# Patient Record
(demographics unavailable — no encounter records)

---

## 2025-01-16 NOTE — DISCUSSION/SUMMARY
[FreeTextEntry1] : This note was written by Ryan Balderas on 01/15/2025 actively solely SIMONA Contreras M.D.     All medical record entries made by the Scribe were at my, SIMONA Contreras M.D. direction and personally dictated by me on 01/15/2025. I have personally reviewed the chart and agree that the record reflects my personal performance of the history, physical exam, assessment, and plan.

## 2025-01-16 NOTE — HISTORY OF PRESENT ILLNESS
[FreeTextEntry1] : Pt is a 60-year-old postmenopausal female who presents today for a follow-up GYN visit for an in-office Sono hysterogram. Patient was uncomfortable during procedure. Sono today shows a small polyp. In 2017, she had a biopsy due to fibroid. Denies vb. Patient prefers in office procedure due to anesthesia.   Polyp(s): 1)  0.86 x 1.2 x 0.5 cm on SPR today. Patient was very uncomfortable with infusion of saline and only injected approz 20 cc

## 2025-01-16 NOTE — PLAN
[FreeTextEntry1] : -Discussed options for treatment including in office hysteroscopy vs at the hospital. Risks/ benefits discussed with patient. -Rx for Cytotec sent to pharmacy.  -RTO on January 30th at 12pm for in office hysteroscopy. Explained to patient multiple times that this is under local anesthesia and could be just as uncomfortable or even more so than SPR but patient insistent on doing it in the office

## 2025-02-12 NOTE — DISCUSSION/SUMMARY
[Reviewed Clinical Lab Test(s)] : Results of clinical tests were reviewed. [Reviewed Radiology Report(s)] : Radiology reports were reviewed. [Discuss Alternatives/Risks/Benefits w/Patient] : All alternatives, risks, and benefits were discussed with the patient/family and all questions were answered.  Patient expressed good understanding and appreciates the importance of follow up as recommended. [FreeTextEntry1] : I sat down with Mandie and reviewed management of endometrial polyps.  Management of asymptomatic polyps is somewhat controversial. D&C and hysteroscopy can be performed for definitive evaluation.  We discussed the risks and benefits of D&C and hysteroscopy. We discussed the alternative of observation.  All questions answered. Wishes to proceed with D&C and will be scheduled.

## 2025-02-12 NOTE — PAST MEDICAL HISTORY
[Postmenopausal] : The patient is postmenopausal [Menarche Age ____] : age at menarche was [unfilled] [Menopause Age____] : age at menopause was [unfilled] [Definite ___ (Date)] : the last menstrual period was [unfilled] [Total Preg ___] : G[unfilled] [Live Births ___] : P[unfilled]  [Full Term ___] : Full Term: [unfilled] [Abortions ___] : Abortions:[unfilled] [Living ___] : Living: [unfilled] [AB Induced ___] : elective abortions: [unfilled]

## 2025-02-12 NOTE — OB HISTORY
[Total Preg ___] : : [unfilled] [Full Term ___] : [unfilled] (full-term) [Abortions ___] : [unfilled] (abortions) [Living ___] : [unfilled] (living) [AB Induced ___] : [unfilled] elective (s) [Definite ___ (Date)] : the last menstrual period was [unfilled] [Menarche Age ____] : age at menarche was [unfilled] [Menopause  Age ____] : menopause occurred at age [unfilled]

## 2025-02-12 NOTE — DISCUSSION/SUMMARY
[EKG obtained to assist in diagnosis and management of assessed problem(s)] : EKG obtained to assist in diagnosis and management of assessed problem(s) [FreeTextEntry1] : EKG:  Sinus Rhythm at 83 bpm.  Normal intervals and axis.  Unremarkable tracing; no significant change.  PLAN: 1.  HLD - -   continue Repatha at 140 mg every 2 weeks -   Will send out blood work today.   2.  Palps - Zio on 4/2024 with low grade ectopy.  Remains free of any significant palpitations at this time.   3.  Neg. nuclear stress at [facility] in 10/2020  25 minutes spent on today's office visit.   The patient will return for a visit in 6 months.  Will give her a lab requisition to have her labs checked prior to her next visit.

## 2025-02-12 NOTE — HISTORY OF PRESENT ILLNESS
[FreeTextEntry1] : 04/16/2024 - Office visit: PCP: Dr. Geri Allen [facility] notable for hyperlipidemia, IGT, obesity, RUTH (intolerant of CPAP). For the last 4 years, she has had multiple ER visits for shortness of breath (both with and without exertion) felt to be due to esophagitis that have responded well to IV Pepcid. She is scheduled for an EGD with Dr. Hale. Over the last 2-3 weeks, she has had 1-2 episodes of feeling her heart racing, lasting several minutes; she attributes this to stress. She is allergic to statins and may have had hives with Vytorin.  She was recently started on ezetimibe but stopped it because of GI side effects. Prior cardiac workup includes: Nuclear stress test 10/25/2020): Normal study. TTE (1/12/2022): LVEF 65-70%. Lipid profile (10/24/2023): Total cholesterol 311.  HDL 46.  .  .  05/16/2024 - Office visit: She took her first dose Repatha today.  Aug. 20, 2024: As she returns today, she is tolerating the addition of Repatha without problem.  She continues to f/u with her pulmonary MD and had a recent sleep study.  She continues to struggle somewhat with her appliance. She reports no episodes of exertional chest discomfort or dyspnea. There have been no palpitations, and no episodes of lightheadedness or loss of consciousness. There have been no episodes of orthopnea or PND.    Feb. 12, 2025: She remains active and well and describes no cardiac sxs.  She describes no exertional chest discomfort or dyspnea. She reports no palpitations.  There have been no episodes of lightheadedness/LOC. She continues to tolerate Repatha without problem. The dose of Zepbound has been gradually increased; currently, she takes 7.5 mg daily.

## 2025-02-12 NOTE — PHYSICAL EXAM
[Chaperone Present] : A chaperone was present in the examining room during all aspects of the physical examination [[id number]] : A chaperone was present during the pelvic exam. [Normal] : Anus and perineum: Normal sphincter tone, no masses, no prolapse. [Fully active, able to carry on all pre-disease performance without restriction] : Status 0 - Fully active, able to carry on all pre-disease performance without restriction [FreeTextEntry2] : Radha

## 2025-02-12 NOTE — HISTORY OF PRESENT ILLNESS
[FreeTextEntry1] : Self referred.  PCP: Dr. Geri Allen GYN: Dr. Boyd Hep: Dr. Tamera White Cardio: Dr. Reji Malin Pulm: Dr. Michaela Doll  Ms. MARADIAGA is a 60 year old  female LMP 2020 who presents for further evaluation and management of an endometrial polyp. She denies PMB. She was having regular pelvic sonograms as part of her well woman care.   2025 Saline Infusion Sonohysterography  Findings: Endometrium Thickness(mm):    3.23 Anterior wall measures: 1.5 mm. Posterior wall measures: 1.7 mm. Total Endometrial Thickness:  3.23 mm. Polyp(s): 1) 0.86 x 1.2 x 0.5 cm Fibroid(s): None visualized Impression: Endometrial polyp  2017 EMBx - Midcycle endometrium with early secretory change.  [employer] Genetic Testing 10/2015 performed by Dr. Mary Ferrari - Negative. No clinically significant mutation identified.  PMH: HLD, obesity, RUTH, GERD, overactive bladder PSH: Torn meniscus repair Family Hx: Sister with colon cancer, diagnosed at age 60. Maternal grandfather with colon cancer, unsure of age at time of diagnosis. Maternal grandmother with  pancreatic cancer, unsure of age at time of diagnosis.  HM Pap- 2022, Negative for intraepithelial lesion or malignancy Mammo- 2024 Colonscopy-

## 2025-03-03 NOTE — HISTORY OF PRESENT ILLNESS
[FreeTextEntry1] : Ms. 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 No prior liver biopsy (declined). She is on vitamin E 800 IU po daily (10/31/22- ) and also on Zepbound (2024- ).  GI: Dr. Kiko Hale = referring physician Cardiology: Dr. John Dean -> Dr. Andrew Mabry -> Dr. Reji Malin  FibroScan (21): Median liver stiffness 9.2 kPA (consistent with F2 fibrosis though with IQR 26%) and CAP score 349 dB/m (consistent with S3 steatosis).  MRI abdomen with and without contrast (22): Normal liver morphology without focal lesion. Severe diffuse hepatic steatosis with few areas of geographic fatty sparing. Normal spleen. No ascites.  FibroScan (10/24/22): Median liver stiffness 8.4 kPA (consistent with F2 fibrosis) and CAP score 344 dB/m (consistent with S3 steatosis).  FibroScan (10/24/23): Median liver stiffness 3.8 kPA (normal, consistent with F0-1 fibrosis) and CAP score 400 dB/m (consistent with S3 steatosis).  FibroScan (10/22/24): Median liver stiffness 4.9 kPA (normal, consistent with F0-1 fibrosis) and CAP score 336 dB/m (consistent with S3 steatosis).  She was last seen by me in this office on 10/24/23 (>1 year ago) though also had FibroScan here (10/22/24) as above. She presents today for routine follow up. Since our last visit, she started Repatha injections and is also now on Zepbound since 2024, currently at 7.5 mg subcutaneously weekly (increased 1 month ago). She is tolerating it well. She is fatigued at times but feels it has been "more manageable" recently. Her weight is down from a peak of 213 lbs before she started the medication. She notices appetite suppression from the medication. She is trying to eat an appropriate healthy diet including avoiding 7pm-7am eating apart from rare social meals. She drinks 1/2-1 glass of wine on Shabbats and rarely a tiny amount of cognac.   One of her two sisters has MASLD. She has no known family history of cirrhosis or liver cancer. She has a family history of colon cancer (one of her sisters and maternal grandfather), multiple sclerosis (brother), diabetes (both parents), mitral valve endocarditis (mother), and pancreatic cancer (maternal grandmother).  She lives alone in [place]. She is . Her   in 2020 of pancreatic cancer. She has a adult son in his 30s who is  and was living in [place]. No grandchildren. 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 Never smoker. She tried cocaine remotely but otherwise no recreational drug use history.

## 2025-03-03 NOTE — PHYSICAL EXAM
[Non-Tender] : non-tender [Smooth] : smooth [General Appearance - Alert] : alert [General Appearance - In No Acute Distress] : in no acute distress [General Appearance - Well Nourished] : well nourished [General Appearance - Well Developed] : well developed [General Appearance - Well-Appearing] : healthy appearing [Sclera] : the sclera and conjunctiva were normal [Hearing Threshold Finger Rub Not Greene] : hearing was normal [Oropharynx] : the oropharynx was normal [Neck Appearance] : the appearance of the neck was normal [Neck Cervical Mass (___cm)] : no neck mass was observed [Jugular Venous Distention Increased] : there was no jugular-venous distention [Respiration, Rhythm And Depth] : normal respiratory rhythm and effort [Exaggerated Use Of Accessory Muscles For Inspiration] : no accessory muscle use [Auscultation Breath Sounds / Voice Sounds] : lungs were clear to auscultation bilaterally [Heart Rate And Rhythm] : heart rate was normal and rhythm regular [Heart Sounds] : normal S1 and S2 [Murmurs] : no murmurs [Edema] : there was no peripheral edema [Bowel Sounds] : normal bowel sounds [Abdomen Soft] : soft [Abdomen Tenderness] : non-tender [Abdomen Mass (___ Cm)] : no abdominal mass palpated [Abdomen Hernia] : no hernia was discovered [Abnormal Walk] : normal gait [Nail Clubbing] : no clubbing  or cyanosis of the fingernails [Involuntary Movements] : no involuntary movements were seen [Skin Color & Pigmentation] : normal skin color and pigmentation [Skin Turgor] : normal skin turgor [] : no rash [Oriented To Time, Place, And Person] : oriented to person, place, and time [Impaired Insight] : insight and judgment were intact [Affect] : the affect was normal [Mood] : the mood was normal [Memory Recent] : recent memory was not impaired [Memory Remote] : remote memory was not impaired [Scleral Icterus] : No Scleral Icterus [Spider Angioma] : No spider angioma(s) were observed [Abdominal  Ascites] : no ascites [Splenomegaly] : no splenomegaly [Caput Medusae] : no caput medusae observed [Asterixis] : no asterixis observed [Jaundice] : No jaundice [Palmar Erythema] : no Palmar Erythema [FreeTextEntry1] : +central adiposity, +mild hepatomegaly, no splenomegaly

## 2025-03-03 NOTE — ASSESSMENT
[FreeTextEntry1] : # Metabolic dysfunction-associated steatotic liver disease (MASLD): - There was prior concern for moderate (F2) fibrosis on FibroScans (12/14/21 and 10/24/22) though with more recent FibroScans (10/24/23 and 10/22/24) suggesting F0-1 fibrosis. No prior liver biopsy (declined). - Repeat FibroScan again in 1 year for surveillance. - Most recent labs (2/12/25) with improved liver enzymes, now normal apart from minimally elevated ALT 27 U/L. - Discontinue vitamin E today (10/31/22-3/3/25) as we discussed that there are potential harms with long-term use based on population studies and further benefits are probably limited given her preceding use as well as new use of a GLP-1 RA therapy. - Continue Zepbound (8/2024- ), currently 7.5 mg subcutaneously weekly. We have discussed the potential benefits, risks, and side effects of GLP-1 RA therapy including potential indirect hepatic benefits with MASLD regression. - Based on her most recent FibroScans, she is not yet a candidate for resmetirom (Rezdiffra). - She has no recent history of risky alcohol consumption and prior laboratory work-up for other causes of chronic liver disease was negative. We have discussed that given that she has chronic liver disease, recommendation is for complete alcohol abstinence as there is ample evidence that even occasional/social alcohol can accelerate preexisting liver disease and lead to worse outcomes compared to abstainers. - We previously discussed that given the significant risk of cardiovascular disease morbidity/mortality in patients with MASLD, goal LDL would be <70 mg/dL. Her LDL has improved significantly with recent initiation of Repatha with her cardiologist, being used given prior adverse reactions to Lipitor and Vytorin. - We have discussed the diagnosis of MASLD. I reviewed the natural history, evaluation and staging of the disease including her FibroScan results, and prognosis. We discussed the prognosis of MASLD and that it is potentially reversible, but that otherwise there are possible risks of development of compensated cirrhosis, decompensated cirrhosis, and hepatocellular carcinoma (HCC) as well as increased associated risks of diabetes mellitus, chronic kidney disease, and cardiovascular disease. - Today, we again discussed the importance of continued lifestyle modifications for management of MASLD. I have recommended continued gradual weight loss, coffee consumption (up to 3-4 cups/day if desired), adherence to a Mediterranean style diet with increased consumption of vegetables and lean proteins, avoidance of red meat and high fructose corn syrup, and avoidance of calorie-containing beverages. She previously had consultations with nutritionist Viky Muñiz. I recommended moderate intensity exercise for a minimum of 20-30 minutes at least 3 times per week, or ideally at least 150 minutes/week. These changes have been shown to lead to regression or even resolution of steatosis, inflammation, and even fibrosis in some patients.  # Health maintenance: - HAV and HBV non-immune and vaccinations offered but declined (last discussed on 4/25/23). - Ms. MARADIAGA was counseled to: abstain from alcohol; avoid use of herbal and dietary supplements due to potential hepatotoxicity; and limit use of acetaminophen to <2 grams per day.  Next follow-up: 1 year with same day FibroScan

## 2025-03-21 NOTE — PLAN
[FreeTextEntry1] : #HCM -Breast self exam reviewed and taught -STI Screening today declined -Pap/HPV today -Rx for mammogram and breast sonogram given  -To schedule DXA bone density, d/w results pt -Immunizations: UTD  #Endometrial Polyp Removal:  -To schedule D&C and operative hysteroscopic polypectomy. Task sent to CB.  -Discussed procedure in detail, including anesthesia usage.  -To schedule pre-operation consult, desires voice call telemed visit -To schedule pre-operative clearance if needed  #FamHx of Colon Ca: -UTD w/ colonoscopy -Pt follows w/ GI closely  #PHQ9=7 -Pt with known history of depression, mostly due to grief and seasonal changes -Denies SI/HI. Pt has good support, symptoms are well-managed.  -Mental health resources and therapist recommendations offered. Pt declines at this time. -Time Spent: 10 minutes    RTO in 2 wks for Telemed visit for pre-op consult.  LE Lovett MD

## 2025-03-21 NOTE — PHYSICAL EXAM
[Chaperone Present] : A chaperone was present in the examining room during all aspects of the physical examination [Appropriately responsive] : appropriately responsive [Alert] : alert [No Acute Distress] : no acute distress [No Lymphadenopathy] : no lymphadenopathy [Regular Rate Rhythm] : regular rate rhythm [No Murmurs] : no murmurs [Clear to Auscultation B/L] : clear to auscultation bilaterally [Soft] : soft [Non-tender] : non-tender [Non-distended] : non-distended [No HSM] : No HSM [No Lesions] : no lesions [No Mass] : no mass [Oriented x3] : oriented x3 [Examination Of The Breasts] : a normal appearance [No Masses] : no breast masses were palpable [Vulvar Atrophy] : vulvar atrophy [Labia Majora] : normal [Labia Minora] : normal [Atrophy] : atrophy [Normal] : normal [Uterine Adnexae] : normal [FreeTextEntry2] :  Ana Maria araujo)

## 2025-03-21 NOTE — COUNSELING
[Nutrition/ Exercise/ Weight Management] : nutrition, exercise, weight management [Vitamins/Supplements] : vitamins/supplements [Breast Self Exam] : breast self exam [Contraception/ Emergency Contraception/ Safe Sexual Practices] : contraception, emergency contraception, safe sexual practices [Confidentiality] : confidentiality [STD (testing, results, tx)] : STD (testing, results, tx) [Lab Results] : lab results [Pre/Post Op Instructions] : pre/post op instructions

## 2025-03-21 NOTE — HISTORY OF PRESENT ILLNESS
[Patient reported mammogram was normal] : Patient reported mammogram was normal [Patient reported PAP Smear was normal] : Patient reported PAP Smear was normal [Patient reported colonoscopy was normal] : Patient reported colonoscopy was normal [FreeTextEntry1] : 2025. KATIUSKA MARADIAGA 60 year old female  LMP PM (at 56yo) presents to establish care, annual exam, and for endometrial polypecotmy  consultation. PMH of basal cell carcinoma, fatty liver, HLD, menopause, sleep apnea, depression, eczema.   At her last gyn appt, they found an incidental finding of an endometrial polyp .   She denies vaginal bleeding. No vaginal discharge or vaginitis symptoms. She denies abdominal or pelvic pain. BM is normal per patient.   She has continued urine leakage. She has weekly HTNS treatment with her urologist, and has cut back on coffee, reports improved symptoms.   Formerly sexually active with men. Currently sexually active, but no penetrative intercourse due to partner's erectile dysfunction. Engages in oral & digital intercourse.   She has known history of depression, Pt reports that it is likely due to seasonal changes, as well as her 's passing 5 years ago.  Additionally, she recently lost her family member and her best friend recently. Her symptoms are well-managed, she states that she has family member and friendship support.    OBHx:  (TOP w/ D&C x2,  x1  boy)  GynHx: Urine leakage, fibroids, vaginal dryness, Hx of abnormal pap and unknown STD (in , resolved)  PMH: basal cell carcinoma, fatty liver, HLD, menopause, sleep apnea, depression, eczema, hiatal hernia SHx: meniscus repair Meds: Repatha, Zepbound, Calcium, Multivit All: Lipitor Soc: Social alcohol, no drug or tobacco use. former cigarette smoker for 5 yrs (quit )  Psych: depression FHx: Pt is BRCA negative. Sibling w/ colon ca. PGF w/ colon ca. Sister w/ stage 0 colon ca, removed. Father DM, HTN, HLD, stroke. Mother DM, HLD. Denies FHx of ovarian, uterine cancer. PHQ9=7 [TextBox_4] : Saline sono 01/2015- endometrial polyp noted- 0.86 x 1.2 x 0.5 cm  TUVS 1/2025: Uterus: 9.5 cm x 3.3 cm x 5.3 cm. there is a small intramural fibroid measuring up to 8 mm. Endometrium: Endometrial polyp measuring 7 x 4 mm.  Right ovary: 1.7 cm x 0.9 cm x 1.2 cm. Within normal limits. Normal Doppler signal. Left ovary: 2.3 cm x 1.6 cm x 1.3 cm. Within normal limits. Normal Doppler signal  [Mammogramdate] : 11/2024 [TextBox_19] : BIRADS 2 [PapSmeardate] : 08/2022 [TextBox_31] : NILM, HRHPV neg [BoneDensityDate] : 04/2021 [TextBox_37] : normal, due now

## 2025-04-11 NOTE — SIGNATURES
[TextEntry] : This note was written by Rolando Ding on 04/11/2025 actively solely PAYTON Emery M.D.  All medical record entries made by the Scribe were at my, PAYTON Emery M.D. direction and personally dictated by me on 04/11/2025. I have personally reviewed the chart and agree that the record reflects my personal performance of the history, physical exam, assessment, and plan.

## 2025-04-11 NOTE — PLAN
[FreeTextEntry1] : Pre-Op Consult: D&C Operative Hysteroscopy Polypectomy Pt. with known endometrial polyp for D&C hysteroscopic polypectomy Surgery described in detail. Procedure to be performed at [facility]. Discussed risks of procedure to include, but not limited to: - blood loss and possible need for transfusion - infection - perforation with possible injury to viscus or blood vessel that could even require immediate surgical intervention for correction. - fluid issues and deficit. - inability to dilate or creation of false passage -anesthesia - inability to remove or fully remove the polyp - recurrence of polyps - potential malignancy and need for further surgery Pt can take Motrin, Tylenol or Advil for pelvic cramping D/w pt normal vaginal spotting for 1 week. Pt to call me if she has heavy vaginal bleeding, severe pain or fever Pt can return to work the day after the procedure Nothing per vagina, no strenuous exercise and no tub bathing for 2 weeks after the procedure Pt given Cytotec to take the night before procedure (400mcg - 2 tabs of 200mcg given to pt) I will review pathology results with pt in 7-10 days  D&C Operative Hysteroscopic Polypectomy scheduled for 4/21/25.   #Vaginal Itching -Vaginitis panel done -No current or residual evidence of yeast -C/w OTC Miconazole -Rx sent for Diflucan if sxs worsen/persist. To call with results.  RTO for post-op visit (2 wks after procedure) MIGUEL ANGEL Lovett MD

## 2025-04-11 NOTE — HISTORY OF PRESENT ILLNESS
[FreeTextEntry1] : 2025. KATIUSKA MARADIAGA 60 year old female  LMP PM (at 56yo) presents or pre-op visit.  Pt c/o vaginal itching/burning. She took 2 days of OTC Miconazole and reports sxs are improving. Denies abdominal or pelvic pain. Normal urination and BMs.   SHG - EM polyp measuring 0.86 x 1.2 x 0.5cm Plan for D&C operative hysteroscopy polypectomy  OBHx:  (TOP w/ D&C x2,  x1  boy) GynHx: EM polyp, Urine leakage, fibroids, vaginal dryness, Hx of abnormal pap and unknown STD (in , resolved) PMH: basal cell carcinoma, fatty liver, HLD, menopause, sleep apnea, depression, eczema, hiatal hernia SHx: meniscus repair, EM polypectomy Meds: Repatha, Zepbound, Calcium, Multivit All: Lipitor Soc: Social alcohol, no drug or tobacco use. former cigarette smoker for 5 yrs (quit ) Psych: depression FHx: Pt is BRCA negative. Sibling w/ colon ca. PGF w/ colon ca. Sister w/ stage 0 colon ca, removed. Father DM, HTN, HLD, stroke. Mother DM, HLD. Denies FHx of ovarian, uterine cancer.

## 2025-04-11 NOTE — PHYSICAL EXAM
[Vulvar Atrophy] : vulvar atrophy [Labia Majora] : normal [Labia Minora] : normal [Atrophy] : atrophy [Normal] : normal [Uterine Adnexae] : normal [FreeTextEntry2] : Rolando Ding [FreeTextEntry1] : angelica

## 2025-04-16 NOTE — HISTORY OF PRESENT ILLNESS
[No Adverse Anesthesia Reaction] : no adverse anesthesia reaction in self or family member [Family Member] : no family member with adverse anesthesia reaction/sudden death [Self] : no previous adverse anesthesia reaction [FreeTextEntry1] : hysteroscopy [FreeTextEntry2] : 4/21/25 [FreeTextEntry3] : Dr. Wellington [FreeTextEntry4] : Use of NSAID/ ASA-no steroid use within the past 6 mo-no hx of bleeding disorders-no hx of blood clots-no hx of anesthesia reaction  Fhx: clotting disorder-no         bleeding disorder-no  walking up 2 flight of stairs- no CP or SOB.  walking 45 min- no CP or SOB  [FreeTextEntry7] : 5/16/24 ECHO- mild LV diastolic dysfunction [de-identified] : exercise- walking in pool 1 hr daily over the summer. 15 min walking 2 / day reviewed  labs-nl seasonal allergies, deviated septum, sinus congestion but no pain Obesity- on Zepbound.  decr portion size.  no soda, juice. GERD- controls w Pepcid at bedtime.  EGD-  mild gastritis.  pt eliminated coffee lipid- allergic to statin- 20 yr ago.  also had rash w vytorin, lipitor.  doesn't want injectable.  eating cheese, pizza . reviewed 24 Cardio notes-on Repath since April.  reviewed 24 ECHO - mild diastolic dyf. .  has hx of frq UTI- f/u w Dr. White.    s no further CP, palp.  reviewed 25 Cardiology notes rarely wakes up SOB- pt believes this is due to RUTH or GERD fatty liver - reviewed 10/24/23 Elastography- none to mild fibrosis, Stg 3  steatosis- reviewed 10/4/23 Hepatology notes- rec vit E RUTH- reviewed 24 Sleep Med notes. stopped using CPAP- difficult to use due to deviated nasal septum. Also has allergies.  seen allergist, ENT dyspnea- reviewd 24 Pulm notes- reivewed 24 CXR obesity- pt is seeing Nutritionist.  When she started Zepbound had diarrhea but now BM are nl has spine pain- DJD - improved- pt f/u w Spine- rarely takes Naprosyn- was walking 90 min in the pool- had seen Dr. Thompson in Aug 2024- rec PT depression-better.  stable.  seeing a LSW therapist /  - mild anxiety. now 4 yrs since   numbness toes- L foot - 10 yr since meniscus surgery- has appt w Neuro reviewed 3/31/25 GYN notes, PAP- nl.  reviewed 25 GYN - Onco notes reviewed 3/3/25 US abd/ elastography- hepatic steatosis reivewed 3/28/25 Ophth notes

## 2025-04-16 NOTE — PHYSICAL EXAM
[No Acute Distress] : no acute distress [Well Nourished] : well nourished [Well Developed] : well developed [Well-Appearing] : well-appearing [Normal Sclera/Conjunctiva] : normal sclera/conjunctiva [Normal Oropharynx] : the oropharynx was normal [No Lymphadenopathy] : no lymphadenopathy [Supple] : supple [No Respiratory Distress] : no respiratory distress  [No Accessory Muscle Use] : no accessory muscle use [Clear to Auscultation] : lungs were clear to auscultation bilaterally [Normal Rate] : normal rate  [Regular Rhythm] : with a regular rhythm [Normal S1, S2] : normal S1 and S2 [No Murmur] : no murmur heard [No Carotid Bruits] : no carotid bruits [No Edema] : there was no peripheral edema [Soft] : abdomen soft [Non Tender] : non-tender [Non-distended] : non-distended [No Masses] : no abdominal mass palpated [Normal Bowel Sounds] : normal bowel sounds [Normal Supraclavicular Nodes] : no supraclavicular lymphadenopathy [Normal Axillary Nodes] : no axillary lymphadenopathy [Normal Posterior Cervical Nodes] : no posterior cervical lymphadenopathy [Normal Anterior Cervical Nodes] : no anterior cervical lymphadenopathy [Grossly Normal Strength/Tone] : grossly normal strength/tone [No Focal Deficits] : no focal deficits [Normal Gait] : normal gait [Normal Affect] : the affect was normal [Normal Insight/Judgement] : insight and judgment were intact

## 2025-04-16 NOTE — PLAN
[FreeTextEntry1] : EKG- NSR 68 stop all over the counter meds 1 wk prior to surgery pt has RUTH- Sleep apnea precautions- monitor vital until pt is fully awake and alert stop Zepbound 1 wk prior to surgery pt.  non fasting labs- Blood was collected in the office. reviewed 4/5/25 labs - nl CBC, CMP 30  minutes was spent caring for the patient today.  This includes time spent before the visit reviewing the chart, time spent during the visit,  time spent counseling, and educating the patient on the disease course and it's treatment/ management and as well as time spent after the visit on documentation.

## 2025-05-08 NOTE — REASON FOR VISIT
[Follow-Up] : a follow-up evaluation of [de-identified] : 4/21/2025 hystroscopy polypectomy for endometrial polyp

## 2025-05-08 NOTE — HISTORY OF PRESENT ILLNESS
[FreeTextEntry1] : 61 yo s/p D&C operative hysteroscopy polypectomy  Pt had an uneventful post-op course. Denies vaginal bleeding, abdominal pain fever, chills  Pathology reviewed=benign endometrial polyp  [Fever] : no fever [Chills] : no chills [Nausea] : no nausea [Vomiting] : no vomiting [Erythema] : not erythematous [de-identified] : 05/08/2025. KATIUSKA MARADIAGA 60 year old female presents for a post-op visit hysteroscopy and D&C: s/p 4/21/2025 D&C hysteroscopy polypectomy - 3 subcentimeter polyps were seen, 1 fundal, 1 right posterior fundal, and a right anterior fundal polyp. 4/21/2025 pathology - endometrial polyp  She is doing well, denies fever/chills/N/V. Denies vaginitis sxs. Denies urinary sxs. Reports nml BM. Denies abdominal or pelvic pain.

## 2025-05-08 NOTE — PLAN
[FreeTextEntry1] : #Post-op -s/p 4/21/2025 D&C hysteroscopy polypectomy - 3 subcentimeter polyps were seen, 1 fundal, 1 right posterior fundal, and a right anterior fundal polyp -Reviewed pathology - endometrial polyp. -Pelvic exam performed - can go back to normal routine -D/w pt about recurrence of polyps -Call MD if heavy bleeding, fever, or chills    RTO PRN   I, RADHA NIETO, acted as a scribe on behalf of PAYTON Emery M.D on 05/08/2025. Wound Care: Bacitracin

## 2025-05-08 NOTE — REASON FOR VISIT
[Follow-Up] : a follow-up evaluation of [de-identified] : 4/21/2025 hystroscopy polypectomy for endometrial polyp

## 2025-05-08 NOTE — PLAN
[FreeTextEntry1] : #Post-op -s/p 4/21/2025 D&C hysteroscopy polypectomy - 3 subcentimeter polyps were seen, 1 fundal, 1 right posterior fundal, and a right anterior fundal polyp -Reviewed pathology - endometrial polyp. -Pelvic exam performed - can go back to normal routine -D/w pt about recurrence of polyps -Call MD if heavy bleeding, fever, or chills    RTO PRN   I, RADHA NIETO, acted as a scribe on behalf of PAYTON Emery M.D on 05/08/2025.

## 2025-05-08 NOTE — HISTORY OF PRESENT ILLNESS
[FreeTextEntry1] : 61 yo s/p D&C operative hysteroscopy polypectomy  Pt had an uneventful post-op course. Denies vaginal bleeding, abdominal pain fever, chills  Pathology reviewed=benign endometrial polyp  [Fever] : no fever [Chills] : no chills [Nausea] : no nausea [Vomiting] : no vomiting [Erythema] : not erythematous [de-identified] : 05/08/2025. KATIUSKA MARADIAGA 60 year old female presents for a post-op visit hysteroscopy and D&C: s/p 4/21/2025 D&C hysteroscopy polypectomy - 3 subcentimeter polyps were seen, 1 fundal, 1 right posterior fundal, and a right anterior fundal polyp. 4/21/2025 pathology - endometrial polyp  She is doing well, denies fever/chills/N/V. Denies vaginitis sxs. Denies urinary sxs. Reports nml BM. Denies abdominal or pelvic pain.

## 2025-06-26 NOTE — PHYSICAL EXAM
[No Lymphadenopathy] : no lymphadenopathy [de-identified] : oropharynx- mild erythema.  tonsils no enlg [TextEntry] : Constitutional: no acute distress, well nourished, well developed and well-appearing. Pulmonary: no respiratory distress, lungs were clear to auscultation bilaterally, no accessory muscle use. Cardiac: normal rate, with a regular rhythm, normal S1 and S2 and no murmur heard.  Abdomen: abdomen soft, non-tender, non-distended, no abdominal mass palpated and normal bowel sounds. Psychiatric: the affect was normal and insight and judgement were intact

## 2025-06-26 NOTE — PLAN
[FreeTextEntry1] : URI- hydrate, mucinex, vaporizer.  f/u if not improving or if worse or if she still has fever on Monday

## 2025-06-26 NOTE — HISTORY OF PRESENT ILLNESS
[FreeTextEntry8] : CC: cough  2 days- mild cough- prod, hoarse voice, sore throat, fever- 101.8 last night.  took ibuprofen today and last night. no dyspnea.  c/o fatigue.  states had neg home COVID test.  1 episode of vomiting yest when she had phlegm in her throat. today- soft stool w solid stool sore throat has improved

## 2025-06-27 NOTE — PLAN
[FreeTextEntry1] : covid- start paxlovid-SE discussed.   11 minutes was spent caring for the patient today.  This includes time spent before the visit reviewing the chart, time spent during the visit,  time spent counseling, and educating the patient on the disease course and it's treatment/ management and as well as time spent after the visit on documentation.

## 2025-06-27 NOTE — HISTORY OF PRESENT ILLNESS
[Home] : at home, [unfilled] , at the time of the visit. [Medical Office: ([facility])___] : at the medical office located in  [Telephone (audio)] : This telephonic visit was provided via audio only technology. [Technical] : patient unable to effectively utilize tele-video due to technical issues. [Verbal consent obtained from patient] : the patient, [unfilled] [FreeTextEntry8] : CC: cough  3 days- mild cough- prod, hoarse voice, sore throat, fever- 101.8 last night.  took ibuprofen today and last night. no dyspnea.  c/o fatigue.  states had neg home COVID test.  1 episode of vomiting yest when she had phlegm in her throat. today- soft stool w solid stool sore throat has improved today- feels tired,  throat irritated. temp 99.5- 3 hrs. had taken tylenol earlier today.  no dyspnea reviewed 6/26/25 + COVID test pt went to choir event on Sunday-in door and went out friends on Monday  COVID-19 Education:  The patient is suspected of having COVID-19.  Signs and symptoms were discussed.  Patient educated to self-isolate in a room in the home away from others.  Mask if available.  Patient advised not to leave the home Self-treatment discussed including Tylenol for fever, pain and myalgia; cough and cold medications for symptoms.  Patient to check temperature daily.  Monitor for symptoms of respiratory distress.  To notify our office with important status updates.